# Patient Record
Sex: FEMALE | Race: WHITE
[De-identification: names, ages, dates, MRNs, and addresses within clinical notes are randomized per-mention and may not be internally consistent; named-entity substitution may affect disease eponyms.]

---

## 2020-02-12 ENCOUNTER — HOSPITAL ENCOUNTER (OUTPATIENT)
Dept: HOSPITAL 75 - RAD | Age: 35
End: 2020-02-12
Attending: OBSTETRICS & GYNECOLOGY
Payer: COMMERCIAL

## 2020-02-12 DIAGNOSIS — Z34.92: Primary | ICD-10-CM

## 2020-02-12 DIAGNOSIS — Z3A.20: ICD-10-CM

## 2020-02-12 PROCEDURE — 76805 OB US >/= 14 WKS SNGL FETUS: CPT

## 2020-02-12 NOTE — DIAGNOSTIC IMAGING REPORT
INDICATION: Fetal anatomy survey.



TECHNIQUE: Multiple real-time grayscale images were obtained over

the gravid uterus.



COMPARISON: None available.



FINDINGS: 

There is a single live intrauterine pregnancy in the transverse

position. The placenta is anterior in location, and there is no

evidence of previa or placental abruption. The fetal heart rate

is 156 beats per minute. The RIP measures 14.4 cm. 



Fetal biometric data/growth parameters are supplied below.



Fetal anatomy survey was performed and the following structures

are visualized and normal: Cerebellum, cisterna magna, cerebral

ventricles, four-chamber heart, urinary bladder, three-vessel

cord, umbilical cord insertion, spine, kidneys and extremities.



Due to advanced gestational age, the maternal adnexa are

suboptimally evaluated.



Biometrical measurements are as follows:

Biparietal 4.64 cm, age 20 weeks 1 days.

Head circumference 17.23 cm, age 19 weeks 6 days.

Abdominal circumference 15.33 cm, age 20 weeks 4 days.

Femur length 3.53 cm, age 21 weeks 2 days.



Sonographic estimate age: 20 weeks 4 days.

Sonographic estimated date of delivery: 06/27/2020.



Estimated Fetal Weight: 370 gm (+/- 54  gm).

LMP percentile: 66%.



Fetal heart rate: 156 beats per minute.



Fetal number: 1 of 1.



IMPRESSION: 

1. Single live intrauterine pregnancy with normal fetal anatomy

survey.



Dictated by: 



  Dictated on workstation # [id number]

## 2020-06-18 ENCOUNTER — HOSPITAL ENCOUNTER (OUTPATIENT)
Dept: HOSPITAL 75 - PREOP | Age: 35
Discharge: HOME | End: 2020-06-18
Attending: OBSTETRICS & GYNECOLOGY
Payer: COMMERCIAL

## 2020-06-18 VITALS — HEIGHT: 62.01 IN | WEIGHT: 182.32 LBS | BODY MASS INDEX: 33.13 KG/M2

## 2020-06-18 DIAGNOSIS — Z01.818: Primary | ICD-10-CM

## 2020-06-18 DIAGNOSIS — Z11.59: ICD-10-CM

## 2020-06-18 PROCEDURE — 87635 SARS-COV-2 COVID-19 AMP PRB: CPT

## 2020-06-23 ENCOUNTER — HOSPITAL ENCOUNTER (INPATIENT)
Dept: HOSPITAL 75 - LDRP | Age: 35
LOS: 2 days | Discharge: HOME | End: 2020-06-25
Attending: OBSTETRICS & GYNECOLOGY | Admitting: OBSTETRICS & GYNECOLOGY
Payer: COMMERCIAL

## 2020-06-23 VITALS — DIASTOLIC BLOOD PRESSURE: 74 MMHG | SYSTOLIC BLOOD PRESSURE: 110 MMHG

## 2020-06-23 VITALS — DIASTOLIC BLOOD PRESSURE: 73 MMHG | SYSTOLIC BLOOD PRESSURE: 95 MMHG

## 2020-06-23 VITALS — SYSTOLIC BLOOD PRESSURE: 95 MMHG | DIASTOLIC BLOOD PRESSURE: 48 MMHG

## 2020-06-23 VITALS — SYSTOLIC BLOOD PRESSURE: 111 MMHG | DIASTOLIC BLOOD PRESSURE: 73 MMHG

## 2020-06-23 VITALS — SYSTOLIC BLOOD PRESSURE: 84 MMHG | DIASTOLIC BLOOD PRESSURE: 52 MMHG

## 2020-06-23 VITALS — SYSTOLIC BLOOD PRESSURE: 106 MMHG | DIASTOLIC BLOOD PRESSURE: 62 MMHG

## 2020-06-23 VITALS — SYSTOLIC BLOOD PRESSURE: 89 MMHG | DIASTOLIC BLOOD PRESSURE: 55 MMHG

## 2020-06-23 VITALS — HEIGHT: 62.01 IN | WEIGHT: 183.87 LBS | BODY MASS INDEX: 33.41 KG/M2

## 2020-06-23 VITALS — SYSTOLIC BLOOD PRESSURE: 100 MMHG | DIASTOLIC BLOOD PRESSURE: 33 MMHG

## 2020-06-23 VITALS — SYSTOLIC BLOOD PRESSURE: 93 MMHG | DIASTOLIC BLOOD PRESSURE: 77 MMHG

## 2020-06-23 VITALS — SYSTOLIC BLOOD PRESSURE: 123 MMHG | DIASTOLIC BLOOD PRESSURE: 81 MMHG

## 2020-06-23 DIAGNOSIS — Z3A.39: ICD-10-CM

## 2020-06-23 DIAGNOSIS — O34.211: Primary | ICD-10-CM

## 2020-06-23 DIAGNOSIS — D62: ICD-10-CM

## 2020-06-23 LAB
APTT PPP: YELLOW S
BACTERIA #/AREA URNS HPF: (no result) /HPF
BASOPHILS # BLD AUTO: 0 10^3/UL (ref 0–0.1)
BASOPHILS NFR BLD AUTO: 0 % (ref 0–10)
BILIRUB UR QL STRIP: NEGATIVE
EOSINOPHIL # BLD AUTO: 0.1 10^3/UL (ref 0–0.3)
EOSINOPHIL NFR BLD AUTO: 1 % (ref 0–10)
ERYTHROCYTE [DISTWIDTH] IN BLOOD BY AUTOMATED COUNT: 15.3 % (ref 10–14.5)
FIBRINOGEN PPP-MCNC: CLEAR MG/DL
GLUCOSE UR STRIP-MCNC: NEGATIVE MG/DL
HCT VFR BLD CALC: 35 % (ref 35–52)
HGB BLD-MCNC: 11.4 G/DL (ref 11.5–16)
KETONES UR QL STRIP: NEGATIVE
LEUKOCYTE ESTERASE UR QL STRIP: (no result)
LYMPHOCYTES # BLD AUTO: 2.5 X 10^3 (ref 1–4)
LYMPHOCYTES NFR BLD AUTO: 32 % (ref 12–44)
MANUAL DIFFERENTIAL PERFORMED BLD QL: NO
MCH RBC QN AUTO: 27 PG (ref 25–34)
MCHC RBC AUTO-ENTMCNC: 33 G/DL (ref 32–36)
MCV RBC AUTO: 83 FL (ref 80–99)
MONOCYTES # BLD AUTO: 0.7 X 10^3 (ref 0–1)
MONOCYTES NFR BLD AUTO: 8 % (ref 0–12)
NEUTROPHILS # BLD AUTO: 4.7 X 10^3 (ref 1.8–7.8)
NEUTROPHILS NFR BLD AUTO: 59 % (ref 42–75)
NITRITE UR QL STRIP: NEGATIVE
PH UR STRIP: 6.5 [PH] (ref 5–9)
PLATELET # BLD: 236 10^3/UL (ref 130–400)
PMV BLD AUTO: 10.3 FL (ref 7.4–10.4)
PROT UR QL STRIP: NEGATIVE
RBC #/AREA URNS HPF: (no result) /HPF
SP GR UR STRIP: 1.01 (ref 1.02–1.02)
SQUAMOUS #/AREA URNS HPF: (no result) /HPF
WBC # BLD AUTO: 7.9 10^3/UL (ref 4.3–11)
WBC #/AREA URNS HPF: (no result) /HPF

## 2020-06-23 PROCEDURE — 86901 BLOOD TYPING SEROLOGIC RH(D): CPT

## 2020-06-23 PROCEDURE — 36415 COLL VENOUS BLD VENIPUNCTURE: CPT

## 2020-06-23 PROCEDURE — 85025 COMPLETE CBC W/AUTO DIFF WBC: CPT

## 2020-06-23 PROCEDURE — 81000 URINALYSIS NONAUTO W/SCOPE: CPT

## 2020-06-23 PROCEDURE — 86900 BLOOD TYPING SEROLOGIC ABO: CPT

## 2020-06-23 PROCEDURE — 94664 DEMO&/EVAL PT USE INHALER: CPT

## 2020-06-23 PROCEDURE — 87081 CULTURE SCREEN ONLY: CPT

## 2020-06-23 PROCEDURE — 86850 RBC ANTIBODY SCREEN: CPT

## 2020-06-23 RX ADMIN — SODIUM CHLORIDE, SODIUM LACTATE, POTASSIUM CHLORIDE, AND CALCIUM CHLORIDE PRN MLS/HR: 600; 310; 30; 20 INJECTION, SOLUTION INTRAVENOUS at 07:34

## 2020-06-23 RX ADMIN — Medication SCH ML: at 22:37

## 2020-06-23 RX ADMIN — KETOROLAC TROMETHAMINE SCH MG: 30 INJECTION, SOLUTION INTRAMUSCULAR; INTRAVENOUS at 16:57

## 2020-06-23 RX ADMIN — KETOROLAC TROMETHAMINE SCH MG: 30 INJECTION, SOLUTION INTRAMUSCULAR; INTRAVENOUS at 10:13

## 2020-06-23 RX ADMIN — DOCUSATE SODIUM SCH MG: 100 CAPSULE ORAL at 19:26

## 2020-06-23 RX ADMIN — DOCUSATE SODIUM SCH MG: 100 CAPSULE ORAL at 19:57

## 2020-06-23 RX ADMIN — KETOROLAC TROMETHAMINE SCH MG: 30 INJECTION, SOLUTION INTRAMUSCULAR; INTRAVENOUS at 22:37

## 2020-06-23 RX ADMIN — ACETAMINOPHEN SCH MG: 500 TABLET ORAL at 19:57

## 2020-06-23 RX ADMIN — SODIUM CHLORIDE, SODIUM LACTATE, POTASSIUM CHLORIDE, AND CALCIUM CHLORIDE PRN MLS/HR: 600; 310; 30; 20 INJECTION, SOLUTION INTRAVENOUS at 07:10

## 2020-06-23 RX ADMIN — Medication SCH ML: at 19:27

## 2020-06-23 RX ADMIN — ACETAMINOPHEN SCH MG: 500 TABLET ORAL at 12:17

## 2020-06-23 NOTE — NUR
pt assisted up at side of bed. ambulates to bathroom accompanied by this rn. +void without 
difficulty. pericare performed, fresh vpad and underwear on. assisted back to bed without 
incident.

## 2020-06-23 NOTE — NUR
pt transferred to  room 307 via bed accompanied by rn, s.o. and infant. pt and s.o. 
oriented to room and call light. postpartum packet explained. iv pitocin to pump. scd's 
activated. toradol given. vs taken. fresh ice water provided. pt denies further needs or 
concerns.

## 2020-06-23 NOTE — CESAREAN SECTION OPERATIVE
Procedure


 Procedure Note


Pre-operative Diagnosis: Mary Beth Yeager  is a 35  /Para   3/2 ,

Gestational Age 39 1/7 weeks  with history of previous  section


Post-operative Diagnosis: same 


Procedure: Repeat low transverse  section


Physician: JOSE SALMERON 


Estimated blood loss:  500 mL


Disposition:  stable





Findings:


Viable female infant, Apgars 9/9, weight 6#14 ounces, intact placenta, 3vc, 

normal appearing uterus, tubes, and ovaries.


Accessory lobe of the placenta was noted. 





Extensive adhesions of the omentum to fascia, left tube to fascia, thickened 

fascia





Indications:Mary Beth Yeager  is a 35  /Para   3/2 ,Gestational Age 39 1/7

weeks  with history of previous  section presenting for repeat  

section





Procedure Details:


The patient was seen in pre-op and the procedure was discussed with the patient 

in full, including the risks, benefits, and alternatives. All questions were 

answered. The patient was taken to the operating room and a time out was 

performed, verifying patient and procedure.


After spinal anesthesia was placed by our anesthesia colleagues, the patient was

placed in the dorsal supine with leftward tilt for uterine displacement.~ Her 

abdomen was then prepped and draped in the typical sterile fashion. A Pfannens

tiel skin incision was made using a scalpel and carried down through the 

underlying fascia. The fascia was incised in the midline and tented up using 

Kocher clamps. On both the inferior and superior fascia side the rectus muscle 

was dissected off bluntly and sharply using Gloria scissors. The fascia was very 

thickened in the midline due to underlying omental adhesions and the omentum was

poking through and adherent to the underlying fascia.  The muscles and fascia 

was dissected sharply off and the omentum was dissected off in a sharp and blunt

fashion.  There was thinning of the fascia in this area, suggesting a concern 

for future incisional hernia.  The peritoneum was identified and entered bluntly

in the midline. This was then stretched laterally using manual strength. After 

entering the abdominal cavity and confirming minimal intraperitoneal adhesions, 

a large Shane retractor was placed and the lower uterine segment was 

visualized. The bladder flap was advanced over the lower uterine segment.  A 

bladder flap was created with the use of Metzenbaum scissors.  This took down 

the previously noted adhesion. ~ A scalpel was utilized to make a low transverse

uterine incision. Amniotomy was performed with an Allis clamp with return of 

clear fluid. The infant's head was grasped and brought to the level of the 

incision. Fundal pressure was applied and infant was delivered without 

difficulty. The baby was noted to be in the occiput posterior presentation and 

the left hand delivered with delivery of the head.  The right arm was then 

flexed across the chest and facilitated delivery of the baby.  Mouth and nares 

were suctioned with bulb suction. After the umbilical cord was clamped and cut, 

the infant was handed off to the pediatric staff. A sample of cord blood was 

then obtained.


The placenta was delivered intact via uterine massage. The uterus was cleared of

all clots and debris. The uterine incision was closed using 0 Vicryl in a 

running locked fashion. A second imbricated layer was placed using 0 Vicryl in a

running fashion as well.  The bilateral tubes and ovaries appeared normal. But 

there was a benign tubal cyst noted on the left tube and this appeared to be 

adherent to the anterior peritoneum, but the remainder of the tube appeared 

normal.  The abdominal gutters were cleared of all clots and debris. A final 

check of the uterine incision showed it to be hemostatic. The peritoneum was 

closed using 3-0 Vicryl in a running fashion. The fascia was closed with 0 

Vicryl in a running fashion. I used 0 PDS to close the defects in the fascia 

that were concerning for possible hernia (where the omentum had been adherent 

and was dissected off).  The subcutaneous space was hemostatic, and irrigated. 

The subcutaneous space was closed with 3-0 Vicryl in several single interrupted 

stitches. The skin was then closed using 4-0 Monocryl in a running subcuticular 

fashion. The skin edges were reapproximated together and were hemostatic. A 

pressure dressing was applied. All sponge, lap and needle counts were correct at

the end of the procedure per nursing.





Vitals - Labs


Vital Signs - I&O





Vital Signs








  Date Time  Temp Pulse Resp B/P (MAP) Pulse Ox O2 Delivery O2 Flow Rate FiO2


 


20 06:25 36.4 95 18  100 Room Air  











Labs


Laboratory Tests


20 06:25: 


Urine Color YELLOW, Urine Clarity CLEAR, Urine pH 6.5, Urine Specific Gravity 

1.010L, Urine Protein NEGATIVE, Urine Glucose (UA) NEGATIVE, Urine Ketones 

NEGATIVE, Urine Nitrite NEGATIVE, Urine Bilirubin NEGATIVE, Urine Urobilinogen 

0.2, Urine Leukocyte Esterase TRACEH, Urine RBC (Auto) NEGATIVE, Urine RBC NONE,

Urine WBC RARE, Urine Squamous Epithelial Cells 5-10, Urine Crystals NONE, Urine

Bacteria TRACE, Urine Casts NONE, Urine Mucus NEGATIVE, Urine Culture Indicated 

NO


20 06:58: 


White Blood Count 7.9, Red Blood Count 4.22L, Hemoglobin 11.4L, Hematocrit 35, 

Mean Corpuscular Volume 83, Mean Corpuscular Hemoglobin 27, Mean Corpuscular 

Hemoglobin Concent 33, Red Cell Distribution Width 15.3H, Platelet Count 236, 

Mean Platelet Volume 10.3, Neutrophils (%) (Auto) 59, Lymphocytes (%) (Auto) 32,

Monocytes (%) (Auto) 8, Eosinophils (%) (Auto) 1, Basophils (%) (Auto) 0, 

Neutrophils # (Auto) 4.7, Lymphocytes # (Auto) 2.5, Monocytes # (Auto) 0.7, 

Eosinophils # (Auto) 0.1, Basophils # (Auto) 0.0











JOSE SALMERON DO              2020 9:10 am

## 2020-06-23 NOTE — PROGRESS NOTE-PRE OPERATIVE
Pre-Operative Progress Note


H&P Reviewed


The H&P was reviewed, patient examined and no changes noted.


Date Seen by Provider:  2020


Time Seen by Provider:  07:15


Date H&P Reviewed:  2020


Time H&P Reviewed:  07:00


Pre-Operative Diagnosis:  previous  section











JOSE SALMERON DO               2020 07:23

## 2020-06-23 NOTE — XMS REPORT
Continuity of Care Document

                             Created on: 2020



Mary Beth Yeager

External Reference #: [id number]

: 1985

Sex: Female



Demographics





                          Address                   [address] [place]  [address]

 

                          Home Phone                [phone] x

 

                          Preferred Language        Unknown

 

                          Marital Status            Unknown

 

                          Quaker Affiliation     Unknown

 

                          Race                      Unknown

 

                          Ethnic Group              Unknown





Author





                          Organization              Unknown

 

                          Address                   Unknown

 

                          Phone                     Unavailable



              



Allergies

      



             Active           Description           Code           Type         

  Severity   

                Reaction           Onset           Reported/Identified          

 

Relationship to Patient                 Clinical Status        

 

                Yes             ZITHromycin           ZITHromycin           Drug

 Allergy          

                Mild            nausea/vomiting diarrhea                        

   2016        

                                                             

 

                Yes             azithromycin           azithromycin           Dr

ug Allergy        

             Mild           N/V/DIARRHEA                        2018      

            

                                                 

 

                Yes             No Known Drug Allergies           [id number]    

       Drug 

Allergy           Unknown           N/A                             2020  

      

                                                             



                      



Medications

      



There is no data.                  



Problems

      



             Date Dx Coded           Attending           Type           Code    

       

Diagnosis                               Diagnosed By        

 

             1446           JOSE SALMERON DO, Ot           Z01.8

18           

ENCOUNTER FOR OTHER PREPROCEDURAL EXAMIN                    

 

             1446           JOSE SALMERON DO, Ot           Z11.5

9           

ENCOUNTER FOR SCREENING FOR OTHER VIRAL                     

 

             2016           Juana Tang MD            O14.

03           

MILD TO MODERATE PRE-ECLAMPSIA, THIRD TRIMESTER                    

 

             2016           Juana Tang MD            O14.

93           

UNSPECIFIED PRE-ECLAMPSIA, THIRD TRIMESTER                    

 

             2016           Juana Tang MD            O26.

833           

PREGNANCY RELATED RENAL DISEASE, THIRD TRIMESTER                    

 

                2016           Juana Tang MD             

  O41.03X0          

                          OLIGOHYDRAMNIOS, THIRD TRIMESTER, NOT APPLICABLE O    

                

 

             2016           Juana Tang MD            O62.

0           

PRIMARY INADEQUATE CONTRACTIONS                    

 

             2016           Juana Tang MD            O75.

2           

PYREXIA DURING LABOR, NOT ELSEWHERE CLASSIFIED                    

 

             2016           Juana Tang MD            R34 

          

ANURIA AND OLIGURIA                              

 

             2016           Juana Tang MD            Z37.

0           

SINGLE LIVE BIRTH                                

 

             2016           Juana Tang MD            Z3A.

39           

39 WEEKS GESTATION OF PREGNANCY                    

 

             2018           Juana Tang MD            O34.

219           

MATERNAL CARE FOR UNSP TYPE SCAR FROM PREVIOUS ENID                    

 

             2018           Juana Tang MD            Z37.

0           

SINGLE LIVE BIRTH                                

 

             2018           Juana Tang MD            Z3A.

39           

39 WEEKS GESTATION OF PREGNANCY                    

 

             2020           SALMERON DO, JOSE C           Ot           Z34.9

2           

ENCNTR FOR SUPRVSN OF NORMAL PREG, UNSP,                    

 

             2020           FAVIOLA DO, JOSE C           Ot           Z3A.2

0           

20 WEEKS GESTATION OF PREGNANCY                    

 

             2020           FAVIOLA DOLISAA C           Ot           Z34.9

2           

ENCNTR FOR SUPRVSN OF NORMAL PREG, UNSP,                    

 

             2020           SALMERON DO, JOSE C           Ot           Z3A.2

0           

20 WEEKS GESTATION OF PREGNANCY                    

 

             2020           SALMERON DO, JOSE C           Ot           Z34.9

2           

ENCNTR FOR SUPRVSN OF NORMAL PREG, UNSP,                    

 

             2020           SALMERON DO, JOSE C           Ot           Z3A.2

0           

20 WEEKS GESTATION OF PREGNANCY                    

 

             2020           FAVIOLA DOLISAA C           Ot           Z34.9

2           

ENCNTR FOR SUPRVSN OF NORMAL PREG, UNSP,                    

 

             2020           FAVIOLA DO, JOSE C           Ot           Z3A.2

0           

20 WEEKS GESTATION OF PREGNANCY                    



                                                            



Procedures

      



                Code            Description           Performed By           Per

formed On        

 

                                      [id number]                                 EX

TRACTION OF POC, LOW 

CERVICAL, OPEN APPROACH                     Juana Tang MD           

2016        

 

                                      [id number]                                 EX

TRACTION OF POC, LOW 

CERVICAL, OPEN APPROACH                     Juana Tang MD           

2018        



                    



Results

      



                    Test                Result              Range        

 

                                        CBC - 16 07:30         

 

                    MEAN CELL HGB           27.9 pg             27.0-33.0       

 

 

                    MEAN CELL HGB CONCENTRATION           33.3 g/dL           32

.0-37.0        

 

                    MEAN CELL VOLUME           83.8 fl             80.0-100.0   

     

 

                    RED BLOOD CELL           4.44 m/cumm           4.00-6.00    

    

 

                    RED CELL DISTRIBUTION WIDTH           14.8 %              11

.0-15.6        

 

                    WHITE BLOOD CELL           14.6 k/cumm           5.0-10.0   

     

 

                    HEMOGLOBIN           12.4 gm/dL           12.0-16.0        

 

                    HEMATOCRIT           37.2 %              37.0-47.0        

 

                    PLATELET COUNT           281 k/cumm           150-400       

 

 

                                        METABOLIC PANEL, COMPREHN - 16 07:

48         

 

                    POTASSIUM           4.1 mmol/L           3.5-5.3        

 

                    EST GFR (MDRD)           > 60 mL/min           > 59        

 

                    ANION GAP           10 mmol/L           5-15        

 

                    EST CrCl (CG)           > 60 mL/min           > 59        

 

                    GLUCOSE             86 mg/dL            70-99        

 

                    CALCIUM             8.3 mg/dL           8.5-10.1        

 

                    BLOOD UREA NITROGEN           10 mg/dL            7-20      

  

 

                    CREATININE           0.7 mg/dL           0.6-1.0        

 

                    SODIUM              137 mmol/L           135-148        

 

                    CHLORIDE            105 mmol/L                   

 

                    AST/SGOT            20 Units/L           10-37        

 

                    ALT/SGPT            18 Units/L           < 66        

 

                    CARBON DIOXIDE           22 mmol/L           21-32        

 

                    TOTAL PROTEIN           7.0 gm/dL           6.4-8.2        

 

                    ALBUMIN             2.6 gm/dL           3.4-5.0        

 

                    BILI TOTAL           0.2 mg/dL           0.0-1.0        

 

                    ALKALINE PHOSPHATASE TOTAL           108 IU/L            45-

117        

 

                                        URIC ACID - 16 07:48         

 

                    URIC ACID           4.6 mg/dL           2.6-6.0        

 

                                        LACTATE DEHYDROGENASE (LDH/LD) - 

6 07:48         

 

                    LACTATE DEHYDROGENASE (LDH/LD)           217 Units/L        

           

 

                                        CORD ARTERIAL BLOOD GAS - 16 18:45

         

 

                    ARTERIAL CORD BLD BASE EXCESS           -3.2 meq/L          

 -7.6-1.3        

 

                    COMMENT             ARTERIAL                     

 

                    ARTERIAL CORD BICARBONATE           24.2 meq/L           16.

0-27.1        

 

                    ARTERIAL CORD BLOOD PCO2           52 mm Hg            32-69

        

 

                    ARTERIAL CORD BLOOD PH           7.28                7.14-7.

40        

 

                    ARTERIAL CORD BLOOD PO2           12.7 mm Hg           8-33 

       

 

                    ARTERIAL CORD BLOOD O2 SAT           < 15 %              5-5

9        

 

                                        CORD VENOUS BLOOD GAS - 16 18:45  

       

 

                    VENOUS CORD BLOOD BASE EXCESS           -3.4 meq/L          

 -5.8-0.7        

 

                    COMMENT             VENOUS                       

 

                    VENOUS CORD BLOOD HCO3           22.2 meq/L           17.4-2

5.4        

 

                    VENOUS CORD BLOOD PCO2           42 mm Hg            28-57  

      

 

                    VENOUS CORD BLOOD PH           7.34                7.23-7.46

        

 

                    VENOUS CORD BLOOD PO2           23 mm Hg            15-42   

     

 

                    VENOUS CORD BLOOD O2 SAT           42 %                14-75

        

 

                                        UR PROTEIN/CREATININE RATION - 16 

22:00         

 

                    UR CREATININE COMMENT           RANDOM                      

 

 

                    UR PROTEIN COMMENT           RANDOM                       

 

                    UR TOTAL PROTEIN LEVEL           26.4 mg/dL           0.0-11

.9        

 

                    UR CREATININE LEVEL           31.6 mg/dL              

     

 

                    UR PROTEIN/CREATININE RATIO           835 mg/gm           < 

200        

 

                                        CBC - 16 23:50         

 

                    MEAN CELL HGB           27.9 pg             27.0-33.0       

 

 

                    MEAN CELL HGB CONCENTRATION           32.9 g/dL           32

.0-37.0        

 

                    MEAN CELL VOLUME           84.8 fl             80.0-100.0   

     

 

                    RED BLOOD CELL           3.87 m/cumm           4.00-6.00    

    

 

                    RED CELL DISTRIBUTION WIDTH           14.8 %              11

.0-15.6        

 

                    WHITE BLOOD CELL           16.4 k/cumm           5.0-10.0   

     

 

                    HEMOGLOBIN           10.8 gm/dL           12.0-16.0        

 

                    HEMATOCRIT           32.8 %              37.0-47.0        

 

                    PLATELET COUNT           245 k/cumm           150-400       

 

 

                                        METABOLIC PANEL, COMPREHN - 16 00:

00         

 

                    POTASSIUM           3.8 mmol/L           3.5-5.3        

 

                    EST GFR (MDRD)           > 60 mL/min           > 59        

 

                    ANION GAP           9 mmol/L            5-15        

 

                    EST CrCl (CG)           > 60 mL/min           > 59        

 

                    GLUCOSE             119 mg/dL           70-99        

 

                    CALCIUM             7.5 mg/dL           8.5-10.1        

 

                    BLOOD UREA NITROGEN           10 mg/dL            7-20      

  

 

                    CREATININE           0.9 mg/dL           0.6-1.0        

 

                    SODIUM              136 mmol/L           135-148        

 

                    CHLORIDE            104 mmol/L                   

 

                    AST/SGOT            20 Units/L           10-37        

 

                    ALT/SGPT            15 Units/L           < 66        

 

                    CARBON DIOXIDE           23 mmol/L           21-32        

 

                    TOTAL PROTEIN           5.3 gm/dL           6.4-8.2        

 

                    ALBUMIN             1.9 gm/dL           3.4-5.0        

 

                    BILI TOTAL           0.3 mg/dL           0.0-1.0        

 

                    ALKALINE PHOSPHATASE TOTAL           83 IU/L             45-

117        

 

                                        URIC ACID - 16 00:00         

 

                    URIC ACID           5.2 mg/dL           2.6-6.0        

 

                                        LACTATE DEHYDROGENASE (LDH/LD) - 

6 00:00         

 

                    LACTATE DEHYDROGENASE (LDH/LD)           220 Units/L        

           

 

                                        HEMOGLOBIN - 16 12:45         

 

                    MEAN CELL VOLUME           85.2 fl             80.0-100.0   

     

 

                    HEMOGLOBIN           11.0 gm/dL           12.0-16.0        

 

                                        CBC - 18 17:00         

 

                    MEAN CELL HGB           26.0 pg             27.0-33.0       

 

 

                    MEAN CELL HGB CONCENTRATION           31.8 g/dL           32

.0-37.0        

 

                    MEAN CELL VOLUME           81.9 fl             80.0-100.0   

     

 

                    MEAN PLATELET VOLUME           9.9 fl              8.5-10.9 

       

 

                    RED BLOOD CELL           4.15 m/cumm           4.00-6.00    

    

 

                    RED CELL DISTRIBUTION WIDTH           14.0 %              11

.0-15.6        

 

                    WHITE BLOOD CELL           8.3 k/cumm           5.0-10.0    

    

 

                    HEMOGLOBIN           10.8 gm/dL           12.0-16.0        

 

                    HEMATOCRIT           34.0 %              37.0-47.0        

 

                    NRBC %              0.0 /100 WBC           0.0-0.0        

 

                    PLATELET COUNT           267 k/cumm           150-400       

 

 

                                        HEMOGLOBIN - 18 19:19         

 

                    MEAN CELL VOLUME           82.0 fl             80.0-100.0   

     

 

                    HEMOGLOBIN           10.5 gm/dL           12.0-16.0        

 

                                        Coronavirus SARS-CoV-2 SO 2019 - 

0 07:58         

 

                    Coronavirus Ab [Units/volume] in Serum           Negative   

         Negative   

     



                                            



Encounters

      



                ACCT No.           Visit Date/Time           Discharge          

 Status         

             Pt. Type           Provider           Facility           Loc./Unit 

          

Complaint        

 

                [id number]           10/10/2019 12:50:00           10/10/2019 23:59:

59           CLS

                [facility]                          

 [facility]                             

 

                    [id number]           2020 05:35:00           

020 14:47:00        

                DIS             Outpatient           JOSE SALMERON DO          

 [facility]           PREOP                     PREVIOUS        

 

                    [id number]           2020 11:36:00           

020 23:59:59        

                CLS             Outpatient           JOSE SALMERON DO          

 [facility]           RAD                       17 WEEKS GESTATION     

   

 

             [id number]           2020 08:00:00                        P

EN           

Preadmit           JOSE SALMERON DO                                           P

REVIOUS 

       

 

                    [id number]           2018 11:04:00           

018 16:30:00        

                DIS             Inpatient           Kitty PERDOMO [facility]                   SARAHY                              

 

                    [id number]           2016 08:10:00           

016 10:45:00        

                DIS             Inpatient           Kitty PERDOMO [facility]                   DAVION                              

 

                    [id number]           2016 21:39:00           

016 08:06:00        

                DIS             Emergency           Kitty PERDOMO [facility]                   GOLDY                           

 

                    [id number]           2016 02:53:00           

016 05:51:00        

                DIS             Emergency           Kitty PERDOMO [facility]                   GOLDY

## 2020-06-23 NOTE — NUR
VISHAL MCINTOSH presented to unit via ambulatory  from ED, accompanied by , with 
c/o PREVIOUS. VISHAL MCINTOSH weighed, gowned, voided, and to bed.  EFHM and TOCO applied, 
VS taken.  VISHAL MCINTOSH oriented to bed controls, call light, TV, heat, and A/C 
controls.

## 2020-06-24 VITALS — SYSTOLIC BLOOD PRESSURE: 104 MMHG | DIASTOLIC BLOOD PRESSURE: 65 MMHG

## 2020-06-24 VITALS — SYSTOLIC BLOOD PRESSURE: 112 MMHG | DIASTOLIC BLOOD PRESSURE: 64 MMHG

## 2020-06-24 VITALS — SYSTOLIC BLOOD PRESSURE: 112 MMHG | DIASTOLIC BLOOD PRESSURE: 75 MMHG

## 2020-06-24 VITALS — SYSTOLIC BLOOD PRESSURE: 110 MMHG | DIASTOLIC BLOOD PRESSURE: 69 MMHG

## 2020-06-24 VITALS — SYSTOLIC BLOOD PRESSURE: 104 MMHG | DIASTOLIC BLOOD PRESSURE: 71 MMHG

## 2020-06-24 LAB
BASOPHILS # BLD AUTO: 0 10^3/UL (ref 0–0.1)
BASOPHILS NFR BLD AUTO: 0 % (ref 0–10)
EOSINOPHIL # BLD AUTO: 0 10^3/UL (ref 0–0.3)
EOSINOPHIL NFR BLD AUTO: 0 % (ref 0–10)
ERYTHROCYTE [DISTWIDTH] IN BLOOD BY AUTOMATED COUNT: 15.3 % (ref 10–14.5)
HCT VFR BLD CALC: 32 % (ref 35–52)
HGB BLD-MCNC: 10.4 G/DL (ref 11.5–16)
LYMPHOCYTES # BLD AUTO: 1.9 X 10^3 (ref 1–4)
LYMPHOCYTES NFR BLD AUTO: 16 % (ref 12–44)
MANUAL DIFFERENTIAL PERFORMED BLD QL: NO
MCH RBC QN AUTO: 27 PG (ref 25–34)
MCHC RBC AUTO-ENTMCNC: 33 G/DL (ref 32–36)
MCV RBC AUTO: 84 FL (ref 80–99)
MONOCYTES # BLD AUTO: 0.8 X 10^3 (ref 0–1)
MONOCYTES NFR BLD AUTO: 7 % (ref 0–12)
NEUTROPHILS # BLD AUTO: 9.3 X 10^3 (ref 1.8–7.8)
NEUTROPHILS NFR BLD AUTO: 77 % (ref 42–75)
PLATELET # BLD: 202 10^3/UL (ref 130–400)
PMV BLD AUTO: 10.2 FL (ref 7.4–10.4)
WBC # BLD AUTO: 12.1 10^3/UL (ref 4.3–11)

## 2020-06-24 RX ADMIN — IBUPROFEN SCH MG: 600 TABLET ORAL at 11:35

## 2020-06-24 RX ADMIN — ACETAMINOPHEN SCH MG: 500 TABLET ORAL at 13:35

## 2020-06-24 RX ADMIN — IBUPROFEN SCH MG: 600 TABLET ORAL at 18:30

## 2020-06-24 RX ADMIN — DOCUSATE SODIUM SCH MG: 100 CAPSULE ORAL at 21:04

## 2020-06-24 RX ADMIN — DOCUSATE SODIUM SCH MG: 100 CAPSULE ORAL at 11:35

## 2020-06-24 RX ADMIN — ACETAMINOPHEN SCH MG: 500 TABLET ORAL at 21:04

## 2020-06-24 RX ADMIN — ACETAMINOPHEN SCH MG: 500 TABLET ORAL at 04:48

## 2020-06-24 RX ADMIN — KETOROLAC TROMETHAMINE SCH MG: 30 INJECTION, SOLUTION INTRAMUSCULAR; INTRAVENOUS at 04:48

## 2020-06-24 RX ADMIN — Medication SCH ML: at 04:48

## 2020-06-24 NOTE — ANESTHESIA-REGIONAL POST-OP
Regional


Patient Condition


Mental Status:  Alert, Oriented x3


Circulation:  Same as Pre-Op


Headache:  Absent


Sensation:  Full Recovery


Motor Block:  Absent





Post Op Complications


Complications


None





Follow Up Care/Instructions


Patient Instructions


None needed.





Anesthesia/Patient Condition


Patient is doing well, no complaints, stable vital signs, no apparent adverse 

anesthesia problems.   


No complications reported per nursing.


D/C home per [facility] Criteria:  No











RAZIA DUKE CRNA           Jun 24, 2020 10:44

## 2020-06-25 VITALS — DIASTOLIC BLOOD PRESSURE: 69 MMHG | SYSTOLIC BLOOD PRESSURE: 108 MMHG

## 2020-06-25 VITALS — SYSTOLIC BLOOD PRESSURE: 103 MMHG | DIASTOLIC BLOOD PRESSURE: 70 MMHG

## 2020-06-25 RX ADMIN — IBUPROFEN SCH MG: 600 TABLET ORAL at 10:04

## 2020-06-25 RX ADMIN — ACETAMINOPHEN SCH MG: 500 TABLET ORAL at 10:05

## 2020-06-25 RX ADMIN — IBUPROFEN SCH MG: 600 TABLET ORAL at 04:30

## 2020-06-25 RX ADMIN — DOCUSATE SODIUM SCH MG: 100 CAPSULE ORAL at 10:04

## 2020-06-25 RX ADMIN — IBUPROFEN SCH MG: 600 TABLET ORAL at 01:46

## 2020-06-25 NOTE — DISCHARGE INST-WOMEN'S SERVICE
Discharge Inst-Women's Serv


Depart Medication/Instructions


New, Converted or Re-Newed RX:  Transmitted to Pharmacy (and on chart)


Instructions


nothing in the vagina for 6 weeks


Final Diagnosis


previous  section


Problems Reviewed?:  Yes





Consults/Follow Up


Additional Follow Up:  Yes (1 week with Falguni for incision check and 6 week pp

exam)





Activity


Activity:  Activity as Tolerated


Driving Instructions:  No Driving for 1 Week


NO SMOKING:  NO SMOKING


Nothing Inside Vagina:  No Douching, No [place], No Tampons





Diet


Discharge Diet:  No Restrictions


Symptoms to Report to :  Bleeding Excessive, Pain Increased, Fever Over 101 

Degrees F, Vaginal Bleeding Increase, Cramps in Feet or Legs, Vaginal Discharge 

Foul


For Any Problems or Questions:  Contact Your Physician





Skin/Wound Care


Infection Signs and Symptoms:  Increased Redness, Foul Odor of Wound, Increased 

Drainage, Skin Itchy or Has a Rash, Increased Swelling, Temperature Above 101  F


Operative Area Clean and Dry:  Keep Incision Clean/Dry


Stitches/Staples/Dermabond:  Dermabond


Bathing Instructions:  JOSE Cortez DO               2020 08:47

## 2020-06-25 NOTE — NUR
dismissal instructions given, verbalizes understanding.  reviewed follow up appointments and 
Rx's.  signature page signed, placed on chart.

## 2020-06-25 NOTE — NUR
pt dismissed via w/c with PAYTON Osborn, infant and s/o @ side.  infant secured in rear 
facing car seat.  pt stable with no sx's of distress noted.

## 2020-06-25 NOTE — POSTPARTUM PROGRESS NOTE
Post Op


Post-operative Day #2 s/p RLTCS





Subjective:


Patient is without complaints. Ambulating, voiding after duggan removed. 

Tolerating a regular diet without nausea or vomiting. Normal lochia. Pain is 

well controlled with oral pain medications. Passing flatus.  breast feeding. 





Objective:





Laboratory Tests








Test


 20


05:40 Range/Units


 


 


White Blood Count


 12.1 H


 4.3-11.0


10^3/uL


 


Red Blood Count


 3.82 L


 4.35-5.85


10^6/uL


 


Hemoglobin 10.4 L 11.5-16.0  G/DL


 


Hematocrit 32 L 35-52  %


 


Mean Corpuscular Volume 84  80-99  FL


 


Mean Corpuscular Hemoglobin 27  25-34  PG


 


Mean Corpuscular Hemoglobin


Concent 33 


 32-36  G/DL





 


Red Cell Distribution Width 15.3 H 10.0-14.5  %


 


Platelet Count


 202 


 130-400


10^3/uL


 


Mean Platelet Volume 10.2  7.4-10.4  FL


 


Neutrophils (%) (Auto) 77 H 42-75  %


 


Lymphocytes (%) (Auto) 16  12-44  %


 


Monocytes (%) (Auto) 7  0-12  %


 


Eosinophils (%) (Auto) 0  0-10  %


 


Basophils (%) (Auto) 0  0-10  %


 


Neutrophils # (Auto) 9.3 H 1.8-7.8  X 10^3


 


Lymphocytes # (Auto) 1.9  1.0-4.0  X 10^3


 


Monocytes # (Auto) 0.8  0.0-1.0  X 10^3


 


Eosinophils # (Auto)


 0.0 


 0.0-0.3


10^3/uL


 


Basophils # (Auto)


 0.0 


 0.0-0.1


10^3/uL








                          VS - Last 72 Hours, by Label








 20





 06:25 09:04 09:19 09:34


 


Temp 36.4 36.7 36.2 36.3


 


Pulse 95   


 


Resp 18 21 21 16


 


B/P (MAP)  95/73 (80) 100/33 (55) 84/52 (63)


 


Pulse Ox 100 100 100 100


 


O2 Delivery Room Air Room Air Room Air Room Air


 


    





 20





 09:49 09:59 10:15 14:30


 


Temp 36.4 36.3 36.4 36.8


 


Pulse   92 100


 


Resp 20 20 18 18


 


B/P (MAP) 95/48 (64) 93/77 (82) 89/55 (66) 106/62 (77)


 


Pulse Ox 100 100  


 


O2 Delivery Room Air Room Air  


 


    





 20





 17:01 19:57 01:00 04:48


 


Temp 36.3 36.5 36.8 37.0


 


Pulse 84 98 92 109


 


Resp 18 18 18 18


 


B/P (MAP) 123/81 (95) 110/74 (86) 112/64 (80) 112/75 (87)


 


Pulse Ox 99 100 99 99


 


O2 Delivery Room Air Room Air Room Air Room Air


 


    





 20





 08:50 13:41 21:00 01:46


 


Temp 36.4 36.6 36.5 36.6


 


Pulse 106 112 94 80


 


Resp 18 18 18 18


 


B/P (MAP) 104/65 (78) 104/71 (82) 110/69 (83) 103/70 (81)


 


Pulse Ox 98 98 98 98


 


O2 Delivery Room Air Room Air Room Air Room Air











Physical Exam:


General - Alert and oriented, no apparent distress


Abdomen - Soft, appropriately tender to palpation, non-distended, fundus firm at

umbilicus


Incision - clean, dry and intact; no erythema or induration, no drainage


Extremities - no edema, negative Anne Marie's bilaterally


[]





Assessment:


[] post-operative day # [], status post [].


Recovering well, hemodynamically stable


Acute blood loss anemia


[]





Plan:


Routine post-operative care.


Encourage breast feeding.


Encourage ambulation.


VTE prophylaxis:  SCDs.


Ferrous sulfate supplementation.


Plan for discharge []





Vitals - Labs


Vital Signs - I&O





Vital Signs








  Date Time  Temp Pulse Resp B/P (MAP) Pulse Ox O2 Delivery O2 Flow Rate FiO2


 


20 01:46 36.6 80 18 103/70 (81) 98 Room Air  


 


20 21:00 36.5 94 18 110/69 (83) 98 Room Air  


 


20 13:41 36.6 112 18 104/71 (82) 98 Room Air  


 


20 08:50 36.4 106 18 104/65 (78 98 Room Air  














I & O 


 


 20





 07:00


 


Intake Total 2200 ml


 


Balance 2200 ml











Labs





Microbiology


20 MRSA Screen - Final, Complete


          MRSA not isolated











JOSE SALMERON DO               2020 08:43

## 2021-09-21 ENCOUNTER — APPOINTMENT (RX ONLY)
Dept: URBAN - METROPOLITAN AREA CLINIC 51 | Facility: CLINIC | Age: 36
Setting detail: DERMATOLOGY
End: 2021-09-21

## 2021-09-21 DIAGNOSIS — D22 MELANOCYTIC NEVI: ICD-10-CM | Status: STABLE

## 2021-09-21 DIAGNOSIS — L81.3 CAFÉ AU LAIT SPOTS: ICD-10-CM | Status: STABLE

## 2021-09-21 DIAGNOSIS — B07.8 OTHER VIRAL WARTS: ICD-10-CM | Status: STABLE

## 2021-09-21 DIAGNOSIS — Z80.8 FAMILY HISTORY OF MALIGNANT NEOPLASM OF OTHER ORGANS OR SYSTEMS: ICD-10-CM

## 2021-09-21 PROBLEM — D22.4 MELANOCYTIC NEVI OF SCALP AND NECK: Status: ACTIVE | Noted: 2021-09-21

## 2021-09-21 PROBLEM — D23.72 OTHER BENIGN NEOPLASM OF SKIN OF LEFT LOWER LIMB, INCLUDING HIP: Status: ACTIVE | Noted: 2021-09-21

## 2021-09-21 PROBLEM — D23.62 OTHER BENIGN NEOPLASM OF SKIN OF LEFT UPPER LIMB, INCLUDING SHOULDER: Status: ACTIVE | Noted: 2021-09-21

## 2021-09-21 PROCEDURE — 99202 OFFICE O/P NEW SF 15 MIN: CPT

## 2021-09-21 PROCEDURE — ? COUNSELING

## 2021-09-21 PROCEDURE — ? ADDITIONAL NOTES

## 2021-09-21 ASSESSMENT — LOCATION ZONE DERM
LOCATION ZONE: TOE
LOCATION ZONE: TRUNK
LOCATION ZONE: FEET
LOCATION ZONE: SCALP
LOCATION ZONE: LEG

## 2021-09-21 ASSESSMENT — LOCATION SIMPLE DESCRIPTION DERM
LOCATION SIMPLE: RIGHT PLANTAR SURFACE
LOCATION SIMPLE: UPPER BACK
LOCATION SIMPLE: PLANTAR SURFACE OF LEFT 1ST TOE
LOCATION SIMPLE: SCALP
LOCATION SIMPLE: LEFT THIGH

## 2021-09-21 ASSESSMENT — LOCATION DETAILED DESCRIPTION DERM
LOCATION DETAILED: LEFT LATERAL PLANTAR 1ST TOE
LOCATION DETAILED: LEFT INFERIOR FRONTAL SCALP
LOCATION DETAILED: SUPERIOR THORACIC SPINE
LOCATION DETAILED: RIGHT MEDIAL PLANTAR MIDFOOT
LOCATION DETAILED: LEFT ANTERIOR DISTAL THIGH

## 2021-09-21 ASSESSMENT — AREA OF WARTS IN CM2: TOTAL AREA OF ALL WARTS IN CM2: 0

## 2021-09-21 NOTE — PROCEDURE: ADDITIONAL NOTES
Render Risk Assessment In Note?: no
Additional Notes: Patient is going to try otc first
Detail Level: Simple

## 2021-09-21 NOTE — HPI: SKIN LESION
Is This A New Presentation, Or A Follow-Up?: Mole
How Severe Is Your Skin Lesion?: mild
lab/ [facility]
Has Your Skin Lesion Been Treated?: not been treated
Is This A New Presentation, Or A Follow-Up?: Skin Lesion